# Patient Record
Sex: FEMALE | URBAN - METROPOLITAN AREA
[De-identification: names, ages, dates, MRNs, and addresses within clinical notes are randomized per-mention and may not be internally consistent; named-entity substitution may affect disease eponyms.]

---

## 2018-03-12 NOTE — PATIENT DISCUSSION
Continue: PreserVision AREDS 2 (vit c,u-qf-cscob-lutein-zeaxan): capsule: [id number] mg-unit-mg-mg 1 capsule twice a day as directed by mouth

## 2020-05-06 ENCOUNTER — IMPORTED ENCOUNTER (OUTPATIENT)
Dept: URBAN - METROPOLITAN AREA CLINIC 9 | Facility: CLINIC | Age: 70
End: 2020-05-06

## 2020-06-16 ENCOUNTER — IMPORTED ENCOUNTER (OUTPATIENT)
Dept: URBAN - METROPOLITAN AREA CLINIC 9 | Facility: CLINIC | Age: 70
End: 2020-06-16

## 2021-10-16 ASSESSMENT — VISUAL ACUITY
OS_SC: 20/25 SN
OS_SC: 20/25 SN
OD_SC: 20/25 SN
OD_SC: 20/25 SN

## 2022-06-30 RX ORDER — DILTIAZEM HYDROCHLORIDE 120 MG/1
1 CAPSULE, EXTENDED RELEASE ORAL
COMMUNITY
Start: 2022-05-03

## 2022-06-30 RX ORDER — MONTELUKAST SODIUM 10 MG/1
TABLET ORAL
COMMUNITY

## 2022-06-30 RX ORDER — VALSARTAN 160 MG/1
TABLET ORAL
COMMUNITY

## 2022-06-30 RX ORDER — DULOXETIN HYDROCHLORIDE 60 MG/1
1 CAPSULE, DELAYED RELEASE ORAL
COMMUNITY

## 2022-06-30 RX ORDER — ROSUVASTATIN CALCIUM 10 MG/1
TABLET, COATED ORAL
COMMUNITY

## 2025-03-03 NOTE — PATIENT DISCUSSION
General: [de-identified] : December 6, 2022.  Exercised 11 minutes to a heart rate of 160 and a blood pressure of 158/98.  Fatigue but no chest pain.  Minimal upsloping ST depressions not meeting criteria for ischemia and returning to baseline by 1 minute recovery.  Rare VPCs with 1 couplet in recovery. [de-identified] : December 6, 2022.  MAC with mild MR.  Calcified trileaflet aortic valve with normal opening.  No AI.  Aorta 3.6 at the sinus of Valsalva and 3.5 cm ascending aorta.  Normal left atrium.  Normal LV size and systolic function with LVEF 72%.  Normal diastolic function.  Normal RV size and function with mild TR.  RVSP 37.  Normal pericardium with no effusion.